# Patient Record
Sex: FEMALE | Race: BLACK OR AFRICAN AMERICAN | NOT HISPANIC OR LATINO | Employment: UNEMPLOYED | URBAN - METROPOLITAN AREA
[De-identification: names, ages, dates, MRNs, and addresses within clinical notes are randomized per-mention and may not be internally consistent; named-entity substitution may affect disease eponyms.]

---

## 2018-05-30 ENCOUNTER — HOSPITAL ENCOUNTER (EMERGENCY)
Facility: HOSPITAL | Age: 23
Discharge: HOME | End: 2018-05-30
Attending: EMERGENCY MEDICINE
Payer: MEDICAID

## 2018-05-30 VITALS
WEIGHT: 209.38 LBS | TEMPERATURE: 99 F | HEIGHT: 63 IN | HEART RATE: 84 BPM | OXYGEN SATURATION: 100 % | SYSTOLIC BLOOD PRESSURE: 137 MMHG | BODY MASS INDEX: 37.1 KG/M2 | RESPIRATION RATE: 18 BRPM | DIASTOLIC BLOOD PRESSURE: 89 MMHG

## 2018-05-30 DIAGNOSIS — J06.9 VIRAL URI WITH COUGH: ICD-10-CM

## 2018-05-30 DIAGNOSIS — J45.909 BRONCHITIS WITH ASTHMA, SUBACUTE: Primary | ICD-10-CM

## 2018-05-30 DIAGNOSIS — J20.9 BRONCHITIS WITH ASTHMA, SUBACUTE: Primary | ICD-10-CM

## 2018-05-30 PROCEDURE — 25000242 PHARM REV CODE 250 ALT 637 W/ HCPCS: Performed by: PHYSICIAN ASSISTANT

## 2018-05-30 PROCEDURE — 99283 EMERGENCY DEPT VISIT LOW MDM: CPT | Mod: ,,, | Performed by: EMERGENCY MEDICINE

## 2018-05-30 PROCEDURE — 99283 EMERGENCY DEPT VISIT LOW MDM: CPT | Mod: 25

## 2018-05-30 PROCEDURE — 94640 AIRWAY INHALATION TREATMENT: CPT

## 2018-05-30 RX ORDER — PREDNISONE 20 MG/1
20 TABLET ORAL DAILY
Qty: 5 TABLET | Refills: 0 | Status: SHIPPED | OUTPATIENT
Start: 2018-05-30 | End: 2018-06-04

## 2018-05-30 RX ORDER — IPRATROPIUM BROMIDE AND ALBUTEROL SULFATE 2.5; .5 MG/3ML; MG/3ML
3 SOLUTION RESPIRATORY (INHALATION)
Status: COMPLETED | OUTPATIENT
Start: 2018-05-30 | End: 2018-05-30

## 2018-05-30 RX ADMIN — IPRATROPIUM BROMIDE AND ALBUTEROL SULFATE 3 ML: .5; 3 SOLUTION RESPIRATORY (INHALATION) at 08:05

## 2018-05-31 NOTE — ED PROVIDER NOTES
"Encounter Date: 5/30/2018       History     Chief Complaint   Patient presents with    Cough     "i think i have bronchitis". hurts to cough. throat itches     Ms Echavarria is a 23YOAAF who presents with cough and mild SOB on exertion, pertinent PMHx asthma with inhaler use only needed during viral illness. Patient reports last inhaler use previous year during viral URI illness. She endorses 3 day history of "scratchy" throat, dry cough and mild headache. She has not taken anything for symptoms. She also endorses mild SOB when working out yesterday. She denies pleuritic chest pain, CP, abdominal pain, dysuria, hematuria, LOC, dizziness, confusion, recent antibiotic use. Patient has Ventolin inhaler at home, she has used it once for current illness with moderate alleviation of symptoms. She denies hospitalizations d/t asthma Hx.          Review of patient's allergies indicates:  No Known Allergies  No past medical history on file.  No past surgical history on file.  No family history on file.  Social History   Substance Use Topics    Smoking status: Current Some Day Smoker    Smokeless tobacco: Never Used    Alcohol use Yes      Comment: rarely      Review of Systems   Constitutional: Negative for chills, diaphoresis, fatigue and fever.   HENT: Negative for congestion, ear pain, facial swelling, nosebleeds, rhinorrhea, sinus pain, sinus pressure, sneezing, sore throat ("itchy", denies dysphagia, odynophagia), tinnitus, trouble swallowing and voice change.    Eyes: Negative for photophobia and visual disturbance.   Respiratory: Positive for cough (dry) and shortness of breath (mild, experiencing while exercising yesterday). Negative for chest tightness, wheezing and stridor.    Gastrointestinal: Negative for abdominal pain, constipation, diarrhea, nausea and vomiting.   Genitourinary: Negative for dysuria, flank pain, frequency, hematuria, pelvic pain and urgency.   Musculoskeletal: Negative for back pain, neck pain " and neck stiffness.   Skin: Negative for color change and pallor.   Allergic/Immunologic: Negative for immunocompromised state.   Neurological: Positive for headaches (mild). Negative for dizziness, seizures, syncope, weakness, light-headedness and numbness.   Psychiatric/Behavioral: Negative for agitation, confusion and decreased concentration.       Physical Exam     Initial Vitals [05/30/18 1835]   BP Pulse Resp Temp SpO2   (!) 151/88 84 18 99.1 °F (37.3 °C) 96 %      MAP       109         Physical Exam    Nursing note reviewed.  Constitutional: She appears well-developed and well-nourished. She is not diaphoretic. No distress.   Well-appearing female in NAD, VSS, 96%on RA, afebrile.   HENT:   Head: Normocephalic and atraumatic.   Right Ear: Tympanic membrane and external ear normal.   Left Ear: Tympanic membrane and external ear normal.   Mouth/Throat: Oropharynx is clear and moist and mucous membranes are normal. Normal dentition. No uvula swelling. No oropharyngeal exudate or tonsillar abscesses.   Eyes: Conjunctivae and EOM are normal. Pupils are equal, round, and reactive to light. No scleral icterus.   Neck: Normal range of motion. Neck supple.   Cardiovascular: Normal rate, regular rhythm, normal heart sounds and intact distal pulses.   No murmur heard.  Pulmonary/Chest: Breath sounds normal. No respiratory distress. She has no wheezes. She has no rhonchi. She has no rales. She exhibits no tenderness.   Abdominal: Soft. Bowel sounds are normal. There is no tenderness.   Musculoskeletal: Normal range of motion. She exhibits no edema or tenderness.   Lymphadenopathy:     She has no cervical adenopathy (no pain to palpation of anterior/posterior cervical chain).   Neurological: She is alert and oriented to person, place, and time. She has normal strength. No cranial nerve deficit or sensory deficit.   Skin: Skin is warm and dry. Capillary refill takes less than 2 seconds. No rash noted. No pallor.    Psychiatric: She has a normal mood and affect. Her behavior is normal.         ED Course   Procedures     Imaging Results    None       Labs Reviewed - No data to display       Medical Decision Making:   Initial Assessment:   Patient with asthmatic history presents with viral URI symptoms. No wheezing, rales, rhonchi appreciated on lung exam. Throat and TMs unremarkable. 96% on RA   Differential Diagnosis:   DDX viral URI with underlying airway disease, bronchitis, acute asthma exacerbation. Physical exam and history taking lower clinical suspicion for PTA, strep pharyngitis, otitis media, otitis externa, acute bacterial sinusitis, PNA, atypical PNA.  ED Management:  Nebulizer given d/t subjective MICHAELS with significant relief of symptoms. Due to asthma Hx, I will give patient burst of steroids. No imaging indicated at this time. Cough is minimal, no tessalon indicated at this time. Patient road tested with no drop in oxygen saturation nor increase in subjective symptoms. I considered but do not suspect emergent etiology of patient's symptoms due to benign history, physical exam and symptom relief with ED treatments. We discussed need to f/u with PCP regarding asthma control; patient reports many refills on inhaler medication. I will give patient burst of steroids due to asthmatic history. Patient given strict ED return precautions; she agreed to plan of care and voiced understanding. Discharged home in stable condition.    Renetta Rosario PA-C  05/30/2018    I discussed the following case, diagnosis and plan of care with attending physician.                         Clinical Impression:   The primary encounter diagnosis was Bronchitis with asthma, subacute. A diagnosis of Viral URI with cough was also pertinent to this visit.    Disposition:   Disposition: Discharged  Condition: Stable                        Renetta Rosario PA-C  05/30/18 [id number]

## 2018-05-31 NOTE — DISCHARGE INSTRUCTIONS
Take steroid pack to completion. Follow up with PCP regarding inhaler use. Return to the ED immediately if you develop worsening shortness of breath, chest pain or pressure, or develop fever or chills.    Our goal in the emergency department is to always give you outstanding care and exceptional service. You may receive a survey by mail or e-mail in the next week regarding your experience in our ED. We would greatly appreciate your completing and returning the survey. Your feedback provides us with a way to recognize our staff who give very good care and it helps us learn how to improve when your experience was below our aspiration of excellence.

## 2018-05-31 NOTE — ED NOTES
Pt road tested after nebulizer treatment per nursing communication. Pt ambulated across room with steady gait while on dinamap with this RN. Pt's oxygen saturations remained about 96% during ambulation. HR ranged from . Pt denied SOB or coughing.

## 2018-05-31 NOTE — ED NOTES
HEENT: WDL  Cardiac: WDL, apical pulse regular. Denies chest pain.   Respiratory: Respirations even and unlabored, denies SOB, productive cough x 2 days.   Skin: Warm, dry and intact. No discoloration noted.   Neuro: AAOx4, no abnormalities noted.   Musculoskeletal: No mobility deficits, active ROM in all extremities, denies pain.   GI: Abdomen soft and nontender, denies GI symptoms.   : Voids spontaneously without difficulty.   Psychosocial: No abnormalities noted in behavior, speech, thought process and appearance.

## 2018-09-12 PROCEDURE — 99283 EMERGENCY DEPT VISIT LOW MDM: CPT

## 2018-09-12 PROCEDURE — 99284 EMERGENCY DEPT VISIT MOD MDM: CPT | Mod: ,,, | Performed by: EMERGENCY MEDICINE

## 2018-09-13 ENCOUNTER — HOSPITAL ENCOUNTER (EMERGENCY)
Facility: HOSPITAL | Age: 23
Discharge: HOME | End: 2018-09-13
Attending: EMERGENCY MEDICINE
Payer: MEDICAID

## 2018-09-13 VITALS
OXYGEN SATURATION: 98 % | SYSTOLIC BLOOD PRESSURE: 133 MMHG | RESPIRATION RATE: 20 BRPM | TEMPERATURE: 99 F | HEART RATE: 102 BPM | DIASTOLIC BLOOD PRESSURE: 74 MMHG

## 2018-09-13 DIAGNOSIS — N93.9 VAGINAL BLEEDING, ABNORMAL: Primary | ICD-10-CM

## 2018-09-13 LAB
B-HCG UR QL: NEGATIVE
CTP QC/QA: YES

## 2018-09-13 PROCEDURE — 81025 URINE PREGNANCY TEST: CPT | Performed by: EMERGENCY MEDICINE

## 2018-09-13 RX ORDER — PAROXETINE 30 MG/1
30 TABLET, FILM COATED ORAL EVERY MORNING
COMMUNITY

## 2018-09-13 RX ORDER — IBUPROFEN 800 MG/1
800 TABLET ORAL 3 TIMES DAILY
Qty: 60 TABLET | Refills: 0 | Status: SHIPPED | OUTPATIENT
Start: 2018-09-13

## 2018-09-13 RX ORDER — TRAZODONE HYDROCHLORIDE 100 MG/1
100 TABLET ORAL NIGHTLY
COMMUNITY

## 2018-09-13 NOTE — ED PROVIDER NOTES
Encounter Date: 9/12/2018  SCRIBE #1 NOTE: I, Jung Buckner, am scribing for, and in the presence of,  Todd Bermudez MD. I have scribed the note.       History     Chief Complaint   Patient presents with    Threatened Miscarriage     Pt presents to ED c/o possible miscarriage. Pt states she had a + pregnancy test on Wedneday. LMP was Thursday and lasted 3 days, cannot remember the previous cycle. Pt states she started bleeding again yesterday, which has gotten worse today. Endorses heavy bleeding, dark red blood with clots.          The patient is a 23 y.o. female with co-morbidities including: Depression who presents to the ED with a complaint of heavy vaginal bleeding for about 1 week. She reports it stopped after a few days last week and returned in the last couple days. She reports going through several pads in a day. The patient noted a single positive pregnancy test last week. There is no abdominal pain, nausea, or urinary compliants. The patient reports she had a normal menstrual period last month. No alcohol or tobacco, surgeries or operations. She is nulligravid.             Review of patient's allergies indicates:  No Known Allergies  Past Medical History:   Diagnosis Date    Depression      History reviewed. No pertinent surgical history.  History reviewed. No pertinent family history.  Social History     Tobacco Use    Smoking status: Current Some Day Smoker     Packs/day: 0.25     Types: Cigarettes    Smokeless tobacco: Never Used   Substance Use Topics    Alcohol use: Yes     Comment: rarely     Drug use: No     Review of Systems   Constitutional: Negative.    HENT: Negative.    Eyes: Negative.    Respiratory: Negative.    Cardiovascular: Negative.    Gastrointestinal: Negative.    Genitourinary: Positive for vaginal bleeding.   Musculoskeletal: Negative.    Skin: Negative.    Neurological: Negative.    All other systems reviewed and are negative.      Physical Exam     Initial Vitals  [09/12/18 2232]   BP Pulse Resp Temp SpO2   133/74 102 20 98.9 °F (37.2 °C) 98 %      MAP       --         Physical Exam    Nursing note and vitals reviewed.  Constitutional: She appears well-developed and well-nourished.   HENT:   Head: Normocephalic and atraumatic.   Eyes: Pupils are equal, round, and reactive to light.   Neck: Normal range of motion.   Abdominal: Soft. There is no tenderness.   Genitourinary:   Genitourinary Comments: Pelvic exam deffered   Musculoskeletal: Normal range of motion.   Neurological: She is alert and oriented to person, place, and time.   Skin: Skin is warm and dry.   Psychiatric: She has a normal mood and affect.         ED Course   Procedures  Labs Reviewed   POCT URINE PREGNANCY          Imaging Results    None          Medical Decision Making:   ED Management:  Neg UPT. Will need ob f/u. Had 2 periods this month. No signs of signif hemorrhage or anemia on exam. Possible early miscarriage that completed w recent pos upt.                       Clinical Impression:   The encounter diagnosis was Vaginal bleeding, abnormal.                             Todd Bermudez MD  09/13/18 0336

## 2018-09-13 NOTE — ED NOTES
Miracle Echavarria, an 23 y.o. female presents to the ED c/o dark red vaginal bleeding with clots x 1 week - she reports + pregnancy test at home  Last week the day before the bleeding began - c/o nausea- reports  Going through 3 pads today  - denies cp, sob, h/a, v/d, lightheadedness, dysuria, hematuria      Chief Complaint   Patient presents with    Threatened Miscarriage     Pt presents to ED c/o possible miscarriage. Pt states she had a + pregnancy test on Wedneday. LMP was Thursday and lasted 3 days, cannot remember the previous cycle. Pt states she started bleeding again yesterday, which has gotten worse today. Endorses heavy bleeding, dark red blood with clots.      Review of patient's allergies indicates:  No Known Allergies  No past medical history on file.

## 2019-09-27 ENCOUNTER — APPOINTMENT (RX ONLY)
Dept: URBAN - METROPOLITAN AREA CLINIC 39 | Facility: CLINIC | Age: 24
Setting detail: DERMATOLOGY
End: 2019-09-27

## 2019-09-27 DIAGNOSIS — L70.8 OTHER ACNE: ICD-10-CM

## 2019-09-27 PROCEDURE — ? PRESCRIPTION

## 2019-09-27 PROCEDURE — ? COUNSELING

## 2019-09-27 PROCEDURE — 99024 POSTOP FOLLOW-UP VISIT: CPT

## 2019-09-27 RX ORDER — SPIRONOLACTONE 50 MG/1
1 TABLET, FILM COATED ORAL QD-BID
Qty: 60 | Refills: 5 | Status: ERX | COMMUNITY
Start: 2019-09-27

## 2019-09-27 RX ADMIN — SPIRONOLACTONE 1: 50 TABLET, FILM COATED ORAL at 00:00

## 2019-09-27 ASSESSMENT — LOCATION DETAILED DESCRIPTION DERM
LOCATION DETAILED: LEFT INFERIOR CENTRAL MALAR CHEEK
LOCATION DETAILED: LEFT FOREHEAD
LOCATION DETAILED: RIGHT INFERIOR CENTRAL MALAR CHEEK

## 2019-09-27 ASSESSMENT — LOCATION SIMPLE DESCRIPTION DERM
LOCATION SIMPLE: RIGHT CHEEK
LOCATION SIMPLE: LEFT CHEEK
LOCATION SIMPLE: LEFT FOREHEAD

## 2019-09-27 ASSESSMENT — LOCATION ZONE DERM: LOCATION ZONE: FACE

## 2019-09-27 NOTE — HPI: PIMPLES (ACNE)
Is This A New Presentation, Or A Follow-Up?: Acne
What Type Of Note Output Would You Prefer (Optional)?: Standard Output
Females Only: When Was Your Last Menstrual Period?: 09/27/2019

## 2019-11-25 ENCOUNTER — APPOINTMENT (RX ONLY)
Dept: URBAN - METROPOLITAN AREA CLINIC 39 | Facility: CLINIC | Age: 24
Setting detail: DERMATOLOGY
End: 2019-11-25